# Patient Record
Sex: FEMALE | Race: WHITE
[De-identification: names, ages, dates, MRNs, and addresses within clinical notes are randomized per-mention and may not be internally consistent; named-entity substitution may affect disease eponyms.]

---

## 2017-10-23 ENCOUNTER — HOSPITAL ENCOUNTER (OUTPATIENT)
Dept: HOSPITAL 80 - GIMAGING | Age: 82
End: 2017-10-23
Attending: INTERNAL MEDICINE
Payer: COMMERCIAL

## 2017-10-23 DIAGNOSIS — M25.552: ICD-10-CM

## 2017-10-23 DIAGNOSIS — M16.11: Primary | ICD-10-CM

## 2018-01-03 ENCOUNTER — HOSPITAL ENCOUNTER (INPATIENT)
Dept: HOSPITAL 80 - F3N | Age: 83
LOS: 1 days | Discharge: HOME | DRG: 470 | End: 2018-01-04
Attending: ORTHOPAEDIC SURGERY | Admitting: ORTHOPAEDIC SURGERY
Payer: COMMERCIAL

## 2018-01-03 DIAGNOSIS — E78.5: ICD-10-CM

## 2018-01-03 DIAGNOSIS — I10: ICD-10-CM

## 2018-01-03 DIAGNOSIS — G20: ICD-10-CM

## 2018-01-03 DIAGNOSIS — M16.11: Primary | ICD-10-CM

## 2018-01-03 DIAGNOSIS — E03.9: ICD-10-CM

## 2018-01-03 DIAGNOSIS — Z85.3: ICD-10-CM

## 2018-01-03 PROCEDURE — 0SR904Z REPLACEMENT OF RIGHT HIP JOINT WITH CERAMIC ON POLYETHYLENE SYNTHETIC SUBSTITUTE, OPEN APPROACH: ICD-10-PCS | Performed by: ORTHOPAEDIC SURGERY

## 2018-01-03 RX ADMIN — OXYCODONE HYDROCHLORIDE PRN MG: 15 TABLET ORAL at 15:12

## 2018-01-03 RX ADMIN — CARBIDOPA AND LEVODOPA SCH TAB: 25; 100 TABLET ORAL at 17:15

## 2018-01-03 RX ADMIN — DOCUSATE SODIUM AND SENNOSIDES SCH: 50; 8.6 TABLET ORAL at 12:23

## 2018-01-03 RX ADMIN — Medication SCH MLS: at 15:13

## 2018-01-03 RX ADMIN — OXYCODONE HYDROCHLORIDE PRN MG: 15 TABLET ORAL at 20:55

## 2018-01-03 RX ADMIN — DOCUSATE SODIUM AND SENNOSIDES SCH: 50; 8.6 TABLET ORAL at 20:55

## 2018-01-03 RX ADMIN — ASPIRIN 81 MG SCH: 81 TABLET ORAL at 12:21

## 2018-01-03 RX ADMIN — FAMOTIDINE SCH MG: 20 TABLET, FILM COATED ORAL at 20:56

## 2018-01-03 RX ADMIN — CARBIDOPA AND LEVODOPA SCH TAB: 25; 100 TABLET ORAL at 21:57

## 2018-01-03 RX ADMIN — ACETAMINOPHEN SCH MG: 325 TABLET ORAL at 12:49

## 2018-01-03 RX ADMIN — ACETAMINOPHEN SCH MG: 325 TABLET ORAL at 18:41

## 2018-01-03 RX ADMIN — ASPIRIN 81 MG SCH MG: 81 TABLET ORAL at 20:55

## 2018-01-03 NOTE — PDANEPAE
ANE History of Present Illness





82 year old female w/ PMHx of HTN, Hypothyroidism, prior breast cancer, Raynaud'

s & OA presents for right total hip arthroplasty.





ANE Past Medical History





- Cardiovascular History


Hx Hypertension: Yes


Hx Arrhythmias: No


Hx Chest Pain: No


Hx Coronary Artery / Peripheral Vascular Disease: No


Hx CHF / Valvular Disease: No


Hx Palpitations: No


Cardiovascular History Comment: pcp monitors bp meds





- Pulmonary History


Hx COPD: No


Hx Asthma/Reactive Airway Disease: No


Hx Recent Upper Respiratory Infection: No


Hx Oxygen in Use at Home: No


Hx Sleep Apnea: No


Sleep Apnea Screening Result - Last Documented: Negative





- Neurologic History


Hx Cerebrovascular Accident: No


Hx Seizures: No


Hx Dementia: No


Neurologic History Comment: movement disorder





- Endocrine History


Hx Diabetes: No


Hyperthyroid: No


Obesity: no


Endocrine History Comment: hypothyroidism





- Renal History


Hx Renal Disorders: Yes


Renal History Comment: occ incontinence d/t aging





- Liver History


Hx Hepatic Disorders: No





- Neurological & Psychiatric Hx


Hx Neurological and Psychiatric Disorders: No





- Cancer History


Hx Cancer: Yes


Cancer History Comment: breast cancer with surgery and radiation in 2013 or 2014





- Congenital Disorder History


Hx Congenital Disorders: No





- GI History


Hx Gastrointestinal Disorders: Yes


Gastrointestinal History Comment: collagenous colitis- entocort helps with 

diarrhea





- Other Health History


Other Health History: raynauds disease- hands and feet are always cold.  tmj 

and neck problems the past 10 months d/t extended dental surgery





- Chronic Pain History


Chronic Pain: Yes (right hip)





- Surgical History


Prior Surgeries: hysterectomy 1980.  right breast lumpectomy 2013 or 2014





ANE Review of Systems


Review of systems is: negative


Review of Systems: 








- Exercise capacity


Exercise capacity: >=4 METS


METS (RN): 4 METS





- Systems


Neurological: Reports: tremors





ANE Patient History





- Allergies


Allergies/Adverse Reactions: 








No Known Allergies Allergy (Verified 01/02/18 14:09)


 








- Home Medications


Home medications: home medication list seen and reviewed


Home Medications: 








Atorvastatin Calcium  01/02/18 [Last Taken 01/02/18 18:00]


Entocort EC  01/02/18 [Last Taken 01/01/18]


HCTZ (*)  01/02/18 [Last Taken 01/02/18 08:00]


LEVOTHYROXINE SODIUM  01/02/18 [Last Taken 01/02/18 07:00]


Sinemet 10/100 MG (*)  01/02/18 [Last Taken 01/02/18 16:00]








- NPO status


NPO Status: no food or drink >8 hours


NPO Since - Liquids (Date): 01/01/18


NPO Since - Liquids (Time): 04:30


NPO Since - Solids (Date): 01/02/18


NPO Since - Solids (Time): 22:30





- Anes Hx


Anes Hx: no prior problems





- Smoking Hx


Smoking Status: Former smoker


Marijuana use: No





- Alcohol Use


Alcohol Use: None





- Family Anes Hx


Family Anes Hx: neg - N/A


Family Hx Anesthesia Complications: none





ANE Labs/Vital Signs





- Vital Signs


Vital Signs: reviewed preoperatively; see RN documention for details


Heart Rate: 91


Height: 162.56 cm


Weight: 65.771 kg





ANE Physical Exam





- Airway


Neck exam: FROM


Mallampati Score: Class 2


Mouth exam: normal dental/mouth exam





- Pulmonary


Pulmonary: no respiratory distress





- Cardiovascular


Cardiovascular: regular rate and rhythym





- ASA Status


ASA Status: III





ANE Anesthesia Plan


Anesthesia Plan: GA w LMA, MAC, spinal


Total IV Anesthesia: No

## 2018-01-03 NOTE — POSTOPPROG
Post Op Note


Date of Operation: 01/03/18


Surgeon: MARKIE Willingham


Assistant: arun willingham


Anesthesiologist: dr. elise


Anesthesia: Spinal


Pre-op Diagnosis: right hip OA


Post-op Diagnosis: same


Indication: right hip pain due to OA that failed conservative measures


Procedure: R PALMIRA ant approach


Findings: severe hip OA


Inf/Abcess present in the surg proc area at time of surgery?: No


EBL: 100-500

## 2018-01-04 VITALS
DIASTOLIC BLOOD PRESSURE: 47 MMHG | OXYGEN SATURATION: 96 % | SYSTOLIC BLOOD PRESSURE: 109 MMHG | HEART RATE: 83 BPM | TEMPERATURE: 97.8 F

## 2018-01-04 VITALS — RESPIRATION RATE: 16 BRPM

## 2018-01-04 RX ADMIN — CARBIDOPA AND LEVODOPA SCH TAB: 25; 100 TABLET ORAL at 08:51

## 2018-01-04 RX ADMIN — ACETAMINOPHEN SCH MG: 325 TABLET ORAL at 12:25

## 2018-01-04 RX ADMIN — DOCUSATE SODIUM AND SENNOSIDES SCH: 50; 8.6 TABLET ORAL at 08:53

## 2018-01-04 RX ADMIN — FAMOTIDINE SCH MG: 20 TABLET, FILM COATED ORAL at 08:51

## 2018-01-04 RX ADMIN — OXYCODONE HYDROCHLORIDE PRN MG: 15 TABLET ORAL at 09:55

## 2018-01-04 RX ADMIN — ASPIRIN 81 MG SCH MG: 81 TABLET ORAL at 08:52

## 2018-01-04 RX ADMIN — Medication SCH MLS: at 00:16

## 2018-01-04 RX ADMIN — ACETAMINOPHEN SCH MG: 325 TABLET ORAL at 00:15

## 2018-01-04 RX ADMIN — ACETAMINOPHEN SCH MG: 325 TABLET ORAL at 05:09

## 2018-01-04 NOTE — GDS
[f rep st]



                                                             DISCHARGE SUMMARY





ADMISSION DIAGNOSIS:  Right hip osteoarthritis.



DISCHARGE DIAGNOSIS:  Right hip osteoarthritis.



PROCEDURE:  Right total hip arthroplasty.



VTE PROPHYLAXIS:  Aspirin 81 mg twice daily for 4 weeks recommended.



BRIEF DESCRIPTION OF HOSPITAL STAY:  Patient was admitted for an elective joint arthroplasty.  The pa
melanie tolerated the procedure well and has passed physical therapy.  The patient was given appropriat
e antibiotic prophylaxis and venous thromboembolism prophylaxis.  The patient's pain was well control
led on oral pain medication, patient was holding down food, and had urinated.  Decision was made to d
ischarge the patient.  The patient was given post-operative prescriptions pre-operatively.



PLAN:  To follow up as scheduled with Dr. Mckeon's office in 3 weeks.





Job #:  [id number]

## 2018-01-04 NOTE — GOP
[f rep st]



                                                                OPERATIVE REPORT





DATE OF OPERATION:  01/03/2018



SURGEON:  MERLENE Mckeon MD



ASSISTANT:  HARPAL Garcia.



ANESTHESIA:  Spinal.



PREOPERATIVE DIAGNOSIS:  Right hip osteoarthritis.



POSTOPERATIVE DIAGNOSIS:  Right hip osteoarthritis.



PROCEDURE PERFORMED:  Right total hip arthroplasty.



FINDINGS:  





ESTIMATED BLOOD LOSS:  200 cc.



INDICATIONS:  The patient has progressively worsening arthritis of the hip which has failed medical m
anagement.  The patient understands the treatment options including continued non-operative care and 
has selected surgical intervention.  The patient has decided to undergo total hip arthroplasty via th
e direct anterior approach, understanding the risks of the procedure including, but not limited to, n
eurovascular injury, infection, persistent pain, component wear and loosening, deep venous thrombosis
, pulmonary embolism, limb length inequality, hip instability (including dislocation), and intra-oper
ative fractures.



DESCRIPTION OF PROCEDURE:  After proper identification of the patient including verification and renee
ing the surgical site, the patient was brought to the operating room and placed in the supine positio
n.  All bony prominences were well padded.  Anesthesia was induced without complication and intraveno
us prophylactic antibiotics were administered prior to skin incision. 



The operative leg was placed in the Trumpf Arch table extension and the well leg in a Yellofin leg ho
lder.  The patient was prepped and draped in the usual sterile fashion.  The C-arm was draped for int
ra-operative fluoroscopy to check acetabular position, femoral component position including leg lengt
h and femoral offset. 



Attention was then drawn to surgical exposure of the hip.  An incision was made with a #10 Bard Yamil
r blade starting 3 cm lateral and 3 cm distal to the anterior superior iliac spine measuring 8-10 cm 
and coursing distally toward the greater trochanter.  The skin and subcutaneous tissues were divided 
sharply down to the fascia karen.  The fascia karen was incised in line with the skin incision exposing
 the underlying tensor fascia karen muscle.  The muscle was bluntly elevated from the fascia and the f
irst extracapsular Cobra retractor was placed laterally at the junction of the superior femoral neck 
and greater trochanter.  The lateral femoral circumflex vessels were identified, cauterized, and divi
ded with the Aquamantys bipolar cautery.  The deep investing fascia of the TFL was divided to allow p
malachi mobilization of the muscle preventing damage during the retraction.  The reflected head of the 
rectus femoris muscle was elevated off the anterior hip capsule and a medial Cobra retractor was plac
ed just proximal to the lesser trochanter. 



The anterior capsulotomy was made sharply from the superolateral acetabulum to the saddle junction of
 the superior femoral neck and greater trochanter, then coursing inferomedial towards the lesser troc
hanter.  The retractors were then placed in the intracapsular position for femoral neck osteotomy.  C
orresponding to pre-operative templating, the osteotomy was made with the oscillating saw carefully p
rotecting the greater trochanter and soft tissues.  The femoral head was removed from the acetabulum 
with a corkscrew and confirmed to be severely arthritic with exposed bone, deformity and osteophytes.
  Similar findings were confirmed in the acetabulum.  The Arch table extension was then placed in 40 
degrees external rotation. 



Attention was then drawn to the acetabular preparation.  After placement of the anterior and posterio
r Cobra retractors outside the labrum and intracapsular, the circumferential labrum was removed sharp
ly.  The foveal contents were then removed and hemostasis obtained with cautery. 



The first reamer selected was sized using the removed femoral head.  Reaming began with medialization
 and then commenced in 2 mm increments at 45 degrees of abduction and 15 degrees of anteversion using
 fluoroscopic navigation.  Reaming ceased 1 mm less than the definitive acetabular component and kraig
esponded to the pre-operative templating.  The final acetabular component was inserted using fluorosc
opy to achieve proper orientation yielding excellent purchase and stability in the acetabulum.  The f
inal acetabular liner was then placed and its seating confirmed. 



Attention was then turned to the femur.  The Arch table extension was placed in extension and adducti
on, delivering the osteotomized femoral neck into the wound.  A 2-pronged femoral elevator was placed
 at the calcar and another at the tip of the greater trochanter.  The posterolateral capsule was rele
ased with cautery allowing mobilization of the femur lateral and anterior for preparation.  The exter
nal rotators were visualized and preserved.  A curette and rongeur were used to open the starting poi
nt for broaching.  Serial broaching started with the #0 broach and ended with the broach that exhibit
ed excellent fit in the proximal femur.  A change in pitch during mallet strikes was accompanied by t
he inability to advance the broach any further.  The trial reduction was performed and fluoroscopic n
avigation was utilized to check limb length.  Adjustments were made to equalize limb length according
ly. 



After the final trials were accepted they were removed and the wound was copiously lavaged.  The femo
ral component was seated to the same depth as the final broach and the femoral head was impacted onto
 the clean trunnion.  The hip was then reduced for the final time and once more fluoroscopy was used 
to check that limb length equality was achieved. 



The wound was irrigated and closed in layers, the fascia karen with 2-0 Quill, the subcutaneous tissue
 with 2-0 Quill, and the skin with Dermabond.  Sterile dressings were applied.  Final sharps and spon
ge counts were accurate.  The patient was then transferred to a hospital bed and brought to the [facility] room in stable condition.



IMPLANTS:  Accolade II size 5 at 127.  Acetabular component, a 52 mm Tritanium.  The liner is a Tride
nt X3 32 mm. The head is a Biolox Delta 32 mm -4.





Job #:  [id number]

## 2018-01-04 NOTE — ASDISCHSUM
----------------------------------------------

Discharge Information

----------------------------------------------

Plan Status:Home with No Needs                       Medically Cleared to Leave:

Discharge Date:01/04/2018 12:36 PM                   CM D/C Disposition:Home, Routine, Self-Care

ADT D/C Disposition:Home, Routine, Self-Care         Projected Discharge Date:01/04/2018 12:36 PM

Transportation at D/C:                               Discharge Delay Reason:

Follow-Up Date:01/04/2018 12:36 PM                   Discharge Slot:

Final Diagnosis:

----------------------------------------------

Placement Information

----------------------------------------------

----------------------------------------------

Patient Contact Information

----------------------------------------------

Contact Name:SHARIFA                              Relationship:Valeriano

Address:                                             Home [phone]

                                                     Work Phone:

City:                                                [facility] Phone:

State/Zip Code:                                      Email:

----------------------------------------------

Financial Information

----------------------------------------------

Financial Class:

Primary Plan Desc:MEDICARE INPATIENT                 Primary Plan Number:[id number]

Secondary Plan Desc:[employer]/MDR SUPPLEMENT              Secondary Plan Number:[id number]

 

 

----------------------------------------------

Assessment Information

----------------------------------------------

----------------------------------------------

CM  Assessment

----------------------------------------------

CJR

 

Did you go to joint           Answers:  Yes                                   

class?                                                                        

CM Note

 

CM Note                       

Notes:

Mya is planning to discharge back home with the help of her son and close by friends. She has 

attended the joint class and is well prepared. Mya does not think she will need home health care 

services, but Dr. Mckeon informed her that PT/OT in the hospital will be able to make that 

call. Mya scored a 9 on the RAPT tool, thus meaning additional intervention at discharge may be 

necessary.

 

Date Signed:  12/27/2017 04:10 PM

Electronically Signed By:Madeleine Cutler

 

 

----------------------------------------------

Intervention Information

----------------------------------------------

## 2018-01-04 NOTE — SOAPPROG
SOAP Progress Note


Assessment/Plan: 


Assessment:





Patient is doing well s/p R PALMIRA


pain is well controlled on Norco


VTE ppx: recommend ASA 81 mg BID


anemia: level expected initially postop


d/c planning: d/c to home




















Plan:





01/04/18 12:27





Subjective: 





Mya is doing well today, denies SOB,  chest pain and N/V.


Objective: 





 Vital Signs











Temp Pulse Resp BP Pulse Ox


 


 36.6 C   83   16   109/47 L  96 


 


 01/04/18 11:35  01/04/18 11:35  01/04/18 11:35  01/04/18 11:35  01/04/18 11:35








 Laboratory Results





 01/04/18 04:41 





 01/04/18 04:41 





 











 01/03/18 01/04/18 01/05/18





 05:59 05:59 05:59


 


Intake Total  4525 400


 


Output Total  1300 200


 


Balance  3225 200








RLE: incision dressing is clean and dry, NVI, +pf/df





ICD10 Worksheet


Patient Problems: 


 Problems











Problem Status Onset


 


Primary localized osteoarthritis of right hip Acute